# Patient Record
Sex: MALE | Race: WHITE
[De-identification: names, ages, dates, MRNs, and addresses within clinical notes are randomized per-mention and may not be internally consistent; named-entity substitution may affect disease eponyms.]

---

## 2020-09-16 ENCOUNTER — HOSPITAL ENCOUNTER (EMERGENCY)
Dept: HOSPITAL 94 - ER | Age: 30
Discharge: HOME | End: 2020-09-16
Payer: MEDICAID

## 2020-09-16 VITALS — DIASTOLIC BLOOD PRESSURE: 77 MMHG | SYSTOLIC BLOOD PRESSURE: 131 MMHG

## 2020-09-16 VITALS — HEIGHT: 71 IN | BODY MASS INDEX: 24.69 KG/M2 | WEIGHT: 176.37 LBS

## 2020-09-16 DIAGNOSIS — F15.90: ICD-10-CM

## 2020-09-16 DIAGNOSIS — R42: ICD-10-CM

## 2020-09-16 DIAGNOSIS — F19.90: ICD-10-CM

## 2020-09-16 DIAGNOSIS — F17.200: ICD-10-CM

## 2020-09-16 DIAGNOSIS — R53.1: Primary | ICD-10-CM

## 2020-09-16 DIAGNOSIS — G47.8: ICD-10-CM

## 2020-09-16 DIAGNOSIS — Z72.89: ICD-10-CM

## 2020-09-16 PROCEDURE — 99281 EMR DPT VST MAYX REQ PHY/QHP: CPT

## 2020-09-16 NOTE — NUR
PER MOTHER: 

FATHER HX OF DEATH AT 39 DUE TO HEART ATTACK



MOTHER REPORTS THAT HE HAD A SEIZURE YESTERDAY.

MED HX OF SEIZURES